# Patient Record
(demographics unavailable — no encounter records)

---

## 2025-05-14 NOTE — DATA REVIEWED
[de-identified] : Left wrist radiographs were obtained at [facility] urgent care and reviewed today: There is an acute transversely oriented fracture along the volar lateral aspect of the left distal radial metaphysis with slight apex dorsal angulation. There is an additional buckle fracture along the lateral cortex of the left distal ulnar diametaphysis. Surrounding soft tissue swelling. Joint spaces otherwise preserved. Comparison radiograph is unremarkable.  Left wrist 3 view radiographs were obtained and independently reviewed during today's visit.  Continued visualization of a distal radius and ulna fracture.  Alignment acceptable for age.  No signs of interval healing.  Skeletally immature patient.

## 2025-05-14 NOTE — HISTORY OF PRESENT ILLNESS
[FreeTextEntry1] : Malorie is an 8-year-old female with a left distal radius and ulna fracture sustained on 4/30/2025.  Per report she fell playing tag.  She endorsed immediate pain and discomfort.  She presented to [facility] urgent care where radiographs were obtained and a fracture was noted.  She was placed into a splint and it was recommended she follow-up with pediatric orthopedics.  Today, she reports persistent discomfort about the wrist.  She is using her splint as directed.  She can flex and extend her fingers without discomfort.  She denies any numbness or tingling in the fingers.  She presents today for initial evaluation of her left distal radius and ulna fracture.

## 2025-05-14 NOTE — ASSESSMENT
[FreeTextEntry1] : 8-year-old female with a left distal radius and ulna fracture sustained on 4/30/2025, 1.5 weeks ago, when she fell at school.  -We discussed the history, physical exam, and all available radiographs at length during today's visit with patient and her parent/guardian who served as an independent historian due to child's age and unreliable nature of history. -Left wrist radiographs were obtained at [facility] urgent care and reviewed today: There is an acute transversely oriented fracture along the volar lateral aspect of the left distal radial metaphysis with slight apex dorsal angulation. There is an additional buckle fracture along the lateral cortex of the left distal ulnar diametaphysis. Surrounding soft tissue swelling. Joint spaces otherwise preserved. -Left wrist 3 view radiographs were obtained and independently reviewed during today's visit.  Continued visualization of a distal radius and ulna fracture.  Alignment acceptable for age.  No signs of interval healing.  Skeletally immature patient. -The etiology, pathoanatomy, treatment modalities, and expected natural history of the injury were discussed at length today. -Clinically, she has expected discomfort about the fracture site. -At this time, based on patient age and current fracture alignment, we recommended conservative management with cast immobilization.  -She was placed into a well-padded and molded short arm cast today.  Cast care instructions reviewed. -Nonweightbearing on the left upper extremity.  -OTC NSAIDs as needed -Absolutely no gym, recess, sports, rough play.  School note provided today. -We will plan to see her back in clinic in approximately 3 weeks repeat clinical evaluation and new left wrist radiographs out of cast.  Anticipate transition to a wrist immobilizer at that time.   All questions and concerns were addressed today. Parent and patient verbalize understanding and agree with plan of care.   I, Chapis Rhodes, have acted as a scribe and documented the above information for Dr. Nielsen.   This note was created using Dragon Voice Recognition Software and may have been partially created using [employer] software which was then reviewed and edited to the best of my ability. Sporadic inaccurate translation may have occurred. If there are any questions about content of the note, please contact the office for clarification.

## 2025-05-14 NOTE — REVIEW OF SYSTEMS
[Change in Activity] : change in activity [Joint Pains] : arthralgias [Joint Swelling] : joint swelling  [Malaise] : no malaise [Rash] : no rash [Redness] : no redness [Sore Throat] : no sore throat [Wheezing] : no wheezing [Bladder Infection] : denies bladder infection [Limping] : no limping Trilobed Flap Text: The defect edges were debeveled with a #15 scalpel blade.  Given the location of the defect and the proximity to free margins a trilobed flap was deemed most appropriate.  Using a sterile surgical marker, an appropriate trilobed flap drawn around the defect.    The area thus outlined was incised deep to adipose tissue with a #15 scalpel blade.  The skin margins were undermined to an appropriate distance in all directions utilizing iris scissors.

## 2025-05-14 NOTE — END OF VISIT
[FreeTextEntry3] : IVitaly MD, personally saw and evaluated the patient and developed the plan as documented above. I concur or have edited the note as appropriate.

## 2025-05-14 NOTE — HISTORY OF PRESENT ILLNESS
[FreeTextEntry1] : Malorie is an 8-year-old female with a left distal radius and ulna fracture sustained on 4/30/2025.  Per report she fell playing tag.  She endorsed immediate pain and discomfort.  She presented to [facility] urgent care where radiographs were obtained and a fracture was noted.  She was placed into a splint and it was recommended she follow-up with pediatric orthopedics.  Today, she reports persistent discomfort about the wrist.  She is using her splint as directed.  She can flex and extend her fingers without discomfort.  She denies any numbness or tingling in the fingers.  She presents today for initial evaluation of her left distal radius and ulna fracture.

## 2025-05-14 NOTE — REVIEW OF SYSTEMS
[Change in Activity] : change in activity [Joint Pains] : arthralgias [Joint Swelling] : joint swelling  [Malaise] : no malaise [Rash] : no rash [Redness] : no redness [Sore Throat] : no sore throat [Wheezing] : no wheezing [Bladder Infection] : denies bladder infection [Limping] : no limping

## 2025-05-14 NOTE — PHYSICAL EXAM
[FreeTextEntry1] : GENERAL: alert, cooperative, in NAD SKIN: The skin is intact, warm, pink and dry over the area examined. EYES: Normal conjunctiva, normal eyelids and pupils were equal and round. ENT: normal ears, normal nose and normal lips. CARDIOVASCULAR: brisk capillary refill, but no peripheral edema. RESPIRATORY: The patient is in no apparent respiratory distress. They're taking full deep breaths without use of accessory muscles or evidence of audible wheezes or stridor without the use of a stethoscope. Normal respiratory effort. ABDOMEN: not examined.   Left Wrist: Splint in place removed today for examination Mild apex dorsal wrist deformity No erythema Significant tenderness about the distal radius and ulna No other tenderness of bony prominences or soft tissue structures.  No anatomical snuffbox tenderness.  Range of motion of the wrist deferred due to pain/guarding Able to perform a thumbs up maneuver (PIN), OK sign (AIN), finger crossover (ulnar).  Forearm supination and pronation limited due to wrist discomfort. Fingers are warm, pink, and moving freely.  Radial pulse is +2 B/L. Brisk capillary refill in all 5 fingers. Sensation is intact to light touch distally. Nerve innervation of the hand is intact.

## 2025-05-14 NOTE — DATA REVIEWED
[de-identified] : Left wrist radiographs were obtained at [facility] urgent care and reviewed today: There is an acute transversely oriented fracture along the volar lateral aspect of the left distal radial metaphysis with slight apex dorsal angulation. There is an additional buckle fracture along the lateral cortex of the left distal ulnar diametaphysis. Surrounding soft tissue swelling. Joint spaces otherwise preserved. Comparison radiograph is unremarkable.  Left wrist 3 view radiographs were obtained and independently reviewed during today's visit.  Continued visualization of a distal radius and ulna fracture.  Alignment acceptable for age.  No signs of interval healing.  Skeletally immature patient.

## 2025-05-14 NOTE — ASSESSMENT
[FreeTextEntry1] : 8-year-old female with a left distal radius and ulna fracture sustained on 4/30/2025, 1.5 weeks ago, when she fell at school.  -We discussed the history, physical exam, and all available radiographs at length during today's visit with patient and her parent/guardian who served as an independent historian due to child's age and unreliable nature of history. -Left wrist radiographs were obtained at [facility] urgent care and reviewed today: There is an acute transversely oriented fracture along the volar lateral aspect of the left distal radial metaphysis with slight apex dorsal angulation. There is an additional buckle fracture along the lateral cortex of the left distal ulnar diametaphysis. Surrounding soft tissue swelling. Joint spaces otherwise preserved. -Left wrist 3 view radiographs were obtained and independently reviewed during today's visit.  Continued visualization of a distal radius and ulna fracture.  Alignment acceptable for age.  No signs of interval healing.  Skeletally immature patient. -The etiology, pathoanatomy, treatment modalities, and expected natural history of the injury were discussed at length today. -Clinically, she has expected discomfort about the fracture site. -At this time, based on patient age and current fracture alignment, we recommended conservative management with cast immobilization.  -She was placed into a well-padded and molded short arm cast today.  Cast care instructions reviewed. -Nonweightbearing on the left upper extremity.  -OTC NSAIDs as needed -Absolutely no gym, recess, sports, rough play.  School note provided today. -We will plan to see her back in clinic in approximately 3 weeks repeat clinical evaluation and new left wrist radiographs out of cast.  Anticipate transition to a wrist immobilizer at that time.   All questions and concerns were addressed today. Parent and patient verbalize understanding and agree with plan of care.   I, Chapis Rhodes, have acted as a scribe and documented the above information for Dr. Nielsen.   This note was created using Dragon Voice Recognition Software and may have been partially created using [employer] software which was then reviewed and edited to the best of my ability. Sporadic inaccurate translation may have occurred. If there are any questions about content of the note, please contact the office for clarification.

## 2025-05-14 NOTE — REASON FOR VISIT
[Initial Evaluation] : an initial evaluation [Patient] : patient [Mother] : mother [FreeTextEntry1] : Left distal radius and ulna fracture sustained on 4/30/2025

## 2025-06-10 NOTE — HISTORY OF PRESENT ILLNESS
[0] : currently ~his/her~ pain is 0 out of 10 [FreeTextEntry1] : Malorie is an 8-year-old female with a left distal radius and ulna fracture sustained on 4/30/2025.  Per report she fell playing tag.  She endorsed immediate pain and discomfort.  She presented to [facility] urgent care where radiographs were obtained and a fracture was noted.  She was placed into a splint and it was recommended she follow-up with pediatric orthopedics .She was seen 5/9/25 and placed in a SAC.   Today, she reports no pain. She is doing well in the cast. She is here for cast removal and xrays out of the cast.

## 2025-06-10 NOTE — DATA REVIEWED
[de-identified] : Left wrist radiographs, 3 views, were obtained today in the office 6/2/25 out of the cast which reveals a healing distal radius fracture with slight apex dorsal angulation. There is an additional buckle fracture along the lateral cortex of the left distal ulnar diametaphysis. There is bridging callus noted. Fx line still present on lateral view.

## 2025-06-10 NOTE — ASSESSMENT
[FreeTextEntry1] : 8-year-old female with a left distal radius and ulna fracture sustained on 4/30/2025, when she fell at school.  Overall, doing very well.  -We discussed the history, physical exam, and all available radiographs at length during today's visit with patient and her parent/guardian who served as an independent historian due to child's age and unreliable nature of history. -Left wrist radiographs, 3 views, were obtained today in the office 6/2/25 out of the cast which reveals a healing distal radius fracture with slight apex dorsal angulation. There is an additional buckle fracture along the lateral cortex of the left distal ulnar diametaphysis. There is bridging callus noted. Fx line still present on lateral view.  -The etiology, pathoanatomy, treatment modalities, and expected natural history of the injury were discussed at length today. -Clinically, she is doing well.  -Her short arm cast was removed today.  She tolerated the procedure well. -She was then fitted and placed into a properly fitting wrist immobilizer brace.  Brace care and wear instructions were reviewed. -Brace should be on at all times except for sleep, hygiene, and at the dinner table -Additionally, she will remove the brace for gentle passive/active wrist range of motion exercises.  Sample exercises were demonstrated during today's visit. -No heavy lifting with left upper extremity -Absolutely no gym, recess, sports, rough play.  School note provided today. -Risks of reinjury discussed -We will plan to see her back in clinic in approximately 3 weeks repeat clinical evaluation and new left wrist radiographs. She may swim now but no rough housing in the pool.    All questions answered. Parent in agreement with the plan.   IGenia, MPAS, PAC, have acted as a scribe and documented the above for Dr. Nielsen.

## 2025-06-10 NOTE — REVIEW OF SYSTEMS
[Change in Activity] : change in activity [Joint Pains] : arthralgias [Joint Swelling] : joint swelling  [Malaise] : no malaise [Rash] : no rash [Redness] : no redness [Sore Throat] : no sore throat [Bladder Infection] : denies bladder infection [Wheezing] : no wheezing [Limping] : no limping

## 2025-06-10 NOTE — HISTORY OF PRESENT ILLNESS
[0] : currently ~his/her~ pain is 0 out of 10 [FreeTextEntry1] : Malorie is an 8-year-old female with a left distal radius and ulna fracture sustained on 4/30/2025.  Per report she fell playing tag.  She endorsed immediate pain and discomfort.  She presented to [facility] urgent care where radiographs were obtained and a fracture was noted.  She was placed into a splint and it was recommended she follow-up with pediatric orthopedics .She was seen 5/9/25 and placed in a SAC.   Today, she reports no pain. She is doing well in the cast. She is here for cast removal and xrays out of the cast.

## 2025-06-10 NOTE — PHYSICAL EXAM
[FreeTextEntry1] : GENERAL: alert, cooperative, in NAD SKIN: The skin is intact, warm, pink and dry over the area examined. EYES: Normal conjunctiva, normal eyelids and pupils were equal and round. ENT: normal ears, normal nose and normal lips. CARDIOVASCULAR: brisk capillary refill, but no peripheral edema. RESPIRATORY: The patient is in no apparent respiratory distress. They're taking full deep breaths without use of accessory muscles or evidence of audible wheezes or stridor without the use of a stethoscope. Normal respiratory effort. ABDOMEN: not examined.   Left Wrist: SAC removed today for examination Mild apex dorsal wrist deformity No tenderness over the fx site.  No anatomical snuffbox tenderness.  Range of motion of the wrist deferred due to pain/stiffness from casting.  Able to perform a thumbs up maneuver (PIN), OK sign (AIN), finger crossover (ulnar).  Forearm supination and pronation limited due to wrist discomfort. Fingers are warm, pink, and moving freely.  Radial pulse is +2 B/L. Brisk capillary refill in all 5 fingers. Sensation is intact to light touch distally. Nerve innervation of the hand is intact.

## 2025-06-10 NOTE — REASON FOR VISIT
[Follow Up] : a follow up visit [Patient] : patient [Mother] : mother [FreeTextEntry1] : Left distal radius and ulna fracture sustained on 4/30/2025

## 2025-06-10 NOTE — REVIEW OF SYSTEMS
[Change in Activity] : change in activity [Joint Pains] : arthralgias [Joint Swelling] : joint swelling  [Malaise] : no malaise [Redness] : no redness [Rash] : no rash [Sore Throat] : no sore throat [Wheezing] : no wheezing [Bladder Infection] : denies bladder infection [Limping] : no limping

## 2025-06-10 NOTE — DATA REVIEWED
[de-identified] : Left wrist radiographs, 3 views, were obtained today in the office 6/2/25 out of the cast which reveals a healing distal radius fracture with slight apex dorsal angulation. There is an additional buckle fracture along the lateral cortex of the left distal ulnar diametaphysis. There is bridging callus noted. Fx line still present on lateral view.

## 2025-06-10 NOTE — END OF VISIT
[FreeTextEntry3] : IVitaly MD, personally saw and evaluated the patient and developed the plan as documented above. I concur or have edited the note as appropriate. [Time Spent: ___ minutes] : I have spent [unfilled] minutes of time on the encounter which excludes teaching and separately reported services.

## 2025-06-23 NOTE — DATA REVIEWED
[de-identified] : Left wrist radiographs, 3 views, were obtained today in the office 6/23/25 which reveals a healing distal radius fracture with slight apex dorsal angulation. There is an additional buckle fracture along the lateral cortex of the left distal ulnar diametaphysis. There is bridging callus noted. Fx line still present on lateral view.

## 2025-06-23 NOTE — REVIEW OF SYSTEMS
[Change in Activity] : change in activity [Malaise] : no malaise [Rash] : no rash [Redness] : no redness [Sore Throat] : no sore throat [Wheezing] : no wheezing [Bladder Infection] : denies bladder infection [Limping] : no limping [Joint Pains] : no arthralgias [Joint Swelling] : no joint swelling

## 2025-06-23 NOTE — HISTORY OF PRESENT ILLNESS
[FreeTextEntry1] : Malorie is an 8-year-old female with a left distal radius and ulna fracture sustained on 4/30/2025.  Per report she fell playing tag.  She endorsed immediate pain and discomfort.  She presented to [facility] urgent care where radiographs were obtained and a fracture was noted.  She was placed into a splint and it was recommended she follow-up with pediatric orthopedics .She was seen 5/9/25 and placed in a SAC. The cast was removed on 6/2/25 and transitioned to a wrist brace. Please see prior documents for further information.   Today, she reports no pain. Has been tolerating her brace well without any issues. Denies any need for pain medication at home. Here for further orthopedic evaluation and management.

## 2025-06-23 NOTE — PHYSICAL EXAM
[FreeTextEntry1] : GENERAL: alert, cooperative, in NAD SKIN: The skin is intact, warm, pink and dry over the area examined. EYES: Normal conjunctiva, normal eyelids and pupils were equal and round. ENT: normal ears, normal nose and normal lips. CARDIOVASCULAR: brisk capillary refill, but no peripheral edema. RESPIRATORY: The patient is in no apparent respiratory distress. They're taking full deep breaths without use of accessory muscles or evidence of audible wheezes or stridor without the use of a stethoscope. Normal respiratory effort. ABDOMEN: not examined.   Left Wrist: Mild apex dorsal wrist deformity No tenderness over the fx site.  No anatomical snuffbox tenderness.  Full range of motion without significant stiffness  Able to perform a thumbs up maneuver (PIN), OK sign (AIN), finger crossover (ulnar).  Forearm supination and pronation limited due to wrist discomfort. Fingers are warm, pink, and moving freely.  Radial pulse is +2 B/L. Brisk capillary refill in all 5 fingers. Sensation is intact to light touch distally. Nerve innervation of the hand is intact.

## 2025-06-23 NOTE — ASSESSMENT
[FreeTextEntry1] : 8-year-old female with a left distal radius and ulna fracture sustained on 4/30/2025, when she fell at school.  Overall, doing very well.  -We discussed the history, physical exam, and all available radiographs at length during today's visit with patient and her parent/guardian who served as an independent historian due to child's age and unreliable nature of history. -Left wrist radiographs, 3 views, were obtained today in the office 6/23/25 which reveals a healing distal radius fracture with slight apex dorsal angulation. There is an additional buckle fracture along the lateral cortex of the left distal ulnar diametaphysis. There is bridging callus noted. Fx line still present on lateral view.  -The etiology, pathoanatomy, treatment modalities, and expected natural history of the injury were discussed at length today. -Clinically, she is doing well and tolerating her brace well.  - At this time, she can return to light activities with the brace -No heavy lifting with left upper extremity -Avoid contact sports.  -Risks of reinjury discussed -We will plan to see her back in clinic in approximately 4 weeks repeat clinical evaluation and new left wrist radiographs. All questions answered. Family and patient verbalize understanding of the plan.   IBharti PA-C have acted as scribe and documented the above for Dr. Nielsen

## 2025-07-30 NOTE — DATA REVIEWED
[de-identified] : Left wrist radiographs, 3 views, radiographs were obtained and independently reviewed during today's visit.  Well-healed distal radius fracture with slight apex dorsal angulation. There is a well-healed fracture along the lateral cortex of the left distal ulnar diametaphysis.  Skeletally immature patient.

## 2025-07-30 NOTE — HISTORY OF PRESENT ILLNESS
[FreeTextEntry1] : Malorie is an 8-year-old female with a left distal radius and ulna fracture sustained on 4/30/2025.  Per report she fell playing tag.  She endorsed immediate pain and discomfort.  She presented to [facility] urgent care where radiographs were obtained and a fracture was noted.  She was placed into a splint and it was recommended she follow-up with pediatric orthopedics .She was seen 5/9/25 and placed in a SAC. The cast was removed on 6/2/25 and transitioned to a wrist brace.  Her brace was continued for activities only on 6/23/2025.  Please see prior documents for further information.   Today, she reports no pain. Has been tolerating her brace well without any issues.  She has been using her brace for activities only.  She has been swimming.  Denies any need for pain medication at home. Here for further orthopedic evaluation and management.

## 2025-07-30 NOTE — ASSESSMENT
[FreeTextEntry1] : 8-year-old female with a left distal radius and ulna fracture sustained on 4/30/2025, 3 months ago, when she fell at school.  Overall, doing very well.  -We discussed the interval progress, physical exam, and all available radiographs at length during today's visit with patient and her parent/guardian who served as an independent historian due to child's age and unreliable nature of history. -Left wrist radiographs, 3 views, radiographs were obtained and independently reviewed during today's visit.  Well-healed distal radius fracture with slight apex dorsal angulation. There is a well-healed fracture along the lateral cortex of the left distal ulnar diametaphysis.  Skeletally immature patient. -The etiology, pathoanatomy, treatment modalities, and expected natural history of the injury were again discussed at length today. -Clinically, she is doing well and tolerating her brace well.  -She should continue to use her wrist immobilizer for the next 2 weeks during activities only.  After 2 weeks she can fully discontinue her brace. -She can weight-bear as tolerated on the left upper extremity. -She may return to all activity as tolerated. -Risks of reinjury discussed -We will plan to see her back in clinic in approximately 3 months repeat clinical evaluation and new left wrist radiographs to assess remodeling.   All questions and concerns were addressed today. Parent and patient verbalize understanding and agree with plan of care.   I, Chapis Rhodes, have acted as a scribe and documented the above information for Dr. Nielsen.   This note was created using Dragon Voice Recognition Software and may have been partially created using [employer] software which was then reviewed and edited to the best of my ability. Sporadic inaccurate translation may have occurred. If there are any questions about content of the note, please contact the office for clarification.

## 2025-07-30 NOTE — HISTORY OF PRESENT ILLNESS
[FreeTextEntry1] : Malorie is an 8-year-old female with a left distal radius and ulna fracture sustained on 4/30/2025.  Per report she fell playing tag.  She endorsed immediate pain and discomfort.  She presented to [facility] urgent care where radiographs were obtained and a fracture was noted.  She was placed into a splint and it was recommended she follow-up with pediatric orthopedics .She was seen 5/9/25 and placed in a SAC. The cast was removed on 6/2/25 and transitioned to a wrist brace.  Her brace was continued for activities only on 6/23/2025.  Please see prior documents for further information.   Today, she reports no pain. Has been tolerating her brace well without any issues.  She has been using her brace for activities only.  She has been swimming.  Denies any need for pain medication at home. Here for further orthopedic evaluation and management.

## 2025-07-30 NOTE — DATA REVIEWED
[de-identified] : Left wrist radiographs, 3 views, radiographs were obtained and independently reviewed during today's visit.  Well-healed distal radius fracture with slight apex dorsal angulation. There is a well-healed fracture along the lateral cortex of the left distal ulnar diametaphysis.  Skeletally immature patient.

## 2025-07-30 NOTE — PHYSICAL EXAM
[FreeTextEntry1] : GENERAL: alert, cooperative, in NAD SKIN: The skin is intact, warm, pink and dry over the area examined. EYES: Normal conjunctiva, normal eyelids and pupils were equal and round. ENT: normal ears, normal nose and normal lips. CARDIOVASCULAR: brisk capillary refill, but no peripheral edema. RESPIRATORY: The patient is in no apparent respiratory distress. They're taking full deep breaths without use of accessory muscles or evidence of audible wheezes or stridor without the use of a stethoscope. Normal respiratory effort. ABDOMEN: not examined.   Left Wrist: Improved apex dorsal wrist deformity No tenderness over the fx site.  No anatomical snuffbox tenderness.  Full range of motion without stiffness  Able to perform a thumbs up maneuver (PIN), OK sign (AIN), finger crossover (ulnar).  Forearm supination and pronation limited due to wrist discomfort. Fingers are warm, pink, and moving freely.  Radial pulse is +2 B/L. Brisk capillary refill in all 5 fingers. Sensation is intact to light touch distally. Nerve innervation of the hand is intact.

## 2025-07-30 NOTE — REVIEW OF SYSTEMS
[Change in Activity] : no change in activity [Malaise] : no malaise [Rash] : no rash [Redness] : no redness [Sore Throat] : no sore throat [Wheezing] : no wheezing [Bladder Infection] : denies bladder infection [Limping] : no limping [Joint Pains] : no arthralgias [Joint Swelling] : no joint swelling [Sleep Disturbances] : ~T no sleep disturbances

## 2025-07-30 NOTE — ASSESSMENT
[FreeTextEntry1] : 8-year-old female with a left distal radius and ulna fracture sustained on 4/30/2025, 3 months ago, when she fell at school.  Overall, doing very well.  -We discussed the interval progress, physical exam, and all available radiographs at length during today's visit with patient and her parent/guardian who served as an independent historian due to child's age and unreliable nature of history. -Left wrist radiographs, 3 views, radiographs were obtained and independently reviewed during today's visit.  Well-healed distal radius fracture with slight apex dorsal angulation. There is a well-healed fracture along the lateral cortex of the left distal ulnar diametaphysis.  Skeletally immature patient. -The etiology, pathoanatomy, treatment modalities, and expected natural history of the injury were again discussed at length today. -Clinically, she is doing well and tolerating her brace well.  -She should continue to use her wrist immobilizer for the next 2 weeks during activities only.  After 2 weeks she can fully discontinue her brace. -She can weight-bear as tolerated on the left upper extremity. -She may return to all activity as tolerated. -Risks of reinjury discussed -We will plan to see her back in clinic in approximately 3 months repeat clinical evaluation and new left wrist radiographs to assess remodeling.   All questions and concerns were addressed today. Parent and patient verbalize understanding and agree with plan of care.   I, Chapis Rhodes, have acted as a scribe and documented the above information for Dr. Nielsen.   This note was created using Dragon Voice Recognition Software and may have been partially created using Embibe software which was then reviewed and edited to the best of my ability. Sporadic inaccurate translation may have occurred. If there are any questions about content of the note, please contact the office for clarification.